# Patient Record
Sex: MALE | Race: WHITE | NOT HISPANIC OR LATINO | URBAN - METROPOLITAN AREA
[De-identification: names, ages, dates, MRNs, and addresses within clinical notes are randomized per-mention and may not be internally consistent; named-entity substitution may affect disease eponyms.]

---

## 2018-03-26 ENCOUNTER — APPOINTMENT (OUTPATIENT)
Dept: URBAN - METROPOLITAN AREA CLINIC 186 | Age: 48
Setting detail: DERMATOLOGY
End: 2018-03-26

## 2018-03-26 DIAGNOSIS — D22 MELANOCYTIC NEVI: ICD-10-CM

## 2018-03-26 DIAGNOSIS — L81.4 OTHER MELANIN HYPERPIGMENTATION: ICD-10-CM

## 2018-03-26 DIAGNOSIS — L57.8 OTHER SKIN CHANGES DUE TO CHRONIC EXPOSURE TO NONIONIZING RADIATION: ICD-10-CM

## 2018-03-26 DIAGNOSIS — D18.0 HEMANGIOMA: ICD-10-CM

## 2018-03-26 DIAGNOSIS — L73.8 OTHER SPECIFIED FOLLICULAR DISORDERS: ICD-10-CM

## 2018-03-26 DIAGNOSIS — Z85.820 PERSONAL HISTORY OF MALIGNANT MELANOMA OF SKIN: ICD-10-CM

## 2018-03-26 DIAGNOSIS — L82.1 OTHER SEBORRHEIC KERATOSIS: ICD-10-CM

## 2018-03-26 PROBLEM — D22.62 MELANOCYTIC NEVI OF LEFT UPPER LIMB, INCLUDING SHOULDER: Status: ACTIVE | Noted: 2018-03-26

## 2018-03-26 PROBLEM — D18.01 HEMANGIOMA OF SKIN AND SUBCUTANEOUS TISSUE: Status: ACTIVE | Noted: 2018-03-26

## 2018-03-26 PROCEDURE — OTHER ADDITIONAL NOTES: OTHER

## 2018-03-26 PROCEDURE — OTHER COUNSELING: OTHER

## 2018-03-26 PROCEDURE — 99214 OFFICE O/P EST MOD 30 MIN: CPT

## 2018-03-26 PROCEDURE — OTHER REASSURANCE: OTHER

## 2018-03-26 PROCEDURE — OTHER SUNSCREEN RECOMMENDATIONS: OTHER

## 2018-03-26 ASSESSMENT — LOCATION ZONE DERM
LOCATION ZONE: TRUNK
LOCATION ZONE: TRUNK
LOCATION ZONE: FACE
LOCATION ZONE: ARM

## 2018-03-26 ASSESSMENT — LOCATION DETAILED DESCRIPTION DERM
LOCATION DETAILED: RIGHT MEDIAL SUPERIOR CHEST
LOCATION DETAILED: LEFT PROXIMAL DORSAL FOREARM
LOCATION DETAILED: PERIUMBILICAL SKIN
LOCATION DETAILED: EPIGASTRIC SKIN
LOCATION DETAILED: EPIGASTRIC SKIN
LOCATION DETAILED: LEFT CENTRAL MALAR CHEEK
LOCATION DETAILED: LEFT SUPERIOR UPPER BACK

## 2018-03-26 ASSESSMENT — LOCATION SIMPLE DESCRIPTION DERM
LOCATION SIMPLE: CHEST
LOCATION SIMPLE: LEFT CHEEK
LOCATION SIMPLE: ABDOMEN
LOCATION SIMPLE: ABDOMEN
LOCATION SIMPLE: LEFT UPPER BACK
LOCATION SIMPLE: LEFT FOREARM

## 2018-03-26 NOTE — PROCEDURE: ADDITIONAL NOTES
Additional Notes: Patient had E&R (Excision and Repair) in 2016 to left cheek for Malignant Melanoma

## 2018-03-26 NOTE — HPI: EVALUATION OF SKIN LESION(S)
How Severe Are Your Spot(S)?: mild
Have Your Spot(S) Been Treated In The Past?: has not been treated
Hpi Title: Evaluation of Skin Lesions
Location: Left cheek
Year Removed: 2016

## 2018-03-26 NOTE — PROCEDURE: SUNSCREEN RECOMMENDATIONS

## 2018-09-17 ENCOUNTER — APPOINTMENT (OUTPATIENT)
Dept: URBAN - METROPOLITAN AREA CLINIC 186 | Age: 48
Setting detail: DERMATOLOGY
End: 2018-09-17

## 2018-09-17 DIAGNOSIS — L82.1 OTHER SEBORRHEIC KERATOSIS: ICD-10-CM

## 2018-09-17 DIAGNOSIS — L73.8 OTHER SPECIFIED FOLLICULAR DISORDERS: ICD-10-CM

## 2018-09-17 DIAGNOSIS — D22 MELANOCYTIC NEVI: ICD-10-CM

## 2018-09-17 DIAGNOSIS — Z85.820 PERSONAL HISTORY OF MALIGNANT MELANOMA OF SKIN: ICD-10-CM

## 2018-09-17 DIAGNOSIS — L57.8 OTHER SKIN CHANGES DUE TO CHRONIC EXPOSURE TO NONIONIZING RADIATION: ICD-10-CM

## 2018-09-17 DIAGNOSIS — L81.4 OTHER MELANIN HYPERPIGMENTATION: ICD-10-CM

## 2018-09-17 DIAGNOSIS — D18.0 HEMANGIOMA: ICD-10-CM

## 2018-09-17 PROBLEM — D22.62 MELANOCYTIC NEVI OF LEFT UPPER LIMB, INCLUDING SHOULDER: Status: ACTIVE | Noted: 2018-09-17

## 2018-09-17 PROBLEM — D18.01 HEMANGIOMA OF SKIN AND SUBCUTANEOUS TISSUE: Status: ACTIVE | Noted: 2018-09-17

## 2018-09-17 PROCEDURE — OTHER REASSURANCE: OTHER

## 2018-09-17 PROCEDURE — OTHER COUNSELING: OTHER

## 2018-09-17 PROCEDURE — 99214 OFFICE O/P EST MOD 30 MIN: CPT

## 2018-09-17 PROCEDURE — OTHER ADDITIONAL NOTES: OTHER

## 2018-09-17 PROCEDURE — OTHER SUNSCREEN RECOMMENDATIONS: OTHER

## 2018-09-17 ASSESSMENT — LOCATION ZONE DERM
LOCATION ZONE: ARM
LOCATION ZONE: TRUNK
LOCATION ZONE: FACE
LOCATION ZONE: TRUNK

## 2018-09-17 ASSESSMENT — LOCATION SIMPLE DESCRIPTION DERM
LOCATION SIMPLE: ABDOMEN
LOCATION SIMPLE: LEFT CHEEK
LOCATION SIMPLE: CHEST
LOCATION SIMPLE: ABDOMEN
LOCATION SIMPLE: LEFT UPPER BACK
LOCATION SIMPLE: LEFT FOREARM

## 2018-09-17 ASSESSMENT — LOCATION DETAILED DESCRIPTION DERM
LOCATION DETAILED: LEFT CENTRAL MALAR CHEEK
LOCATION DETAILED: EPIGASTRIC SKIN
LOCATION DETAILED: PERIUMBILICAL SKIN
LOCATION DETAILED: EPIGASTRIC SKIN
LOCATION DETAILED: RIGHT MEDIAL SUPERIOR CHEST
LOCATION DETAILED: LEFT SUPERIOR UPPER BACK
LOCATION DETAILED: LEFT PROXIMAL DORSAL FOREARM

## 2019-03-11 ENCOUNTER — APPOINTMENT (OUTPATIENT)
Dept: URBAN - METROPOLITAN AREA CLINIC 186 | Age: 49
Setting detail: DERMATOLOGY
End: 2019-03-11

## 2019-03-11 DIAGNOSIS — L57.8 OTHER SKIN CHANGES DUE TO CHRONIC EXPOSURE TO NONIONIZING RADIATION: ICD-10-CM

## 2019-03-11 DIAGNOSIS — D18.0 HEMANGIOMA: ICD-10-CM

## 2019-03-11 DIAGNOSIS — L81.4 OTHER MELANIN HYPERPIGMENTATION: ICD-10-CM

## 2019-03-11 DIAGNOSIS — D22 MELANOCYTIC NEVI: ICD-10-CM

## 2019-03-11 DIAGNOSIS — Z85.820 PERSONAL HISTORY OF MALIGNANT MELANOMA OF SKIN: ICD-10-CM

## 2019-03-11 DIAGNOSIS — L82.1 OTHER SEBORRHEIC KERATOSIS: ICD-10-CM

## 2019-03-11 DIAGNOSIS — L73.8 OTHER SPECIFIED FOLLICULAR DISORDERS: ICD-10-CM

## 2019-03-11 PROBLEM — D22.62 MELANOCYTIC NEVI OF LEFT UPPER LIMB, INCLUDING SHOULDER: Status: ACTIVE | Noted: 2019-03-11

## 2019-03-11 PROBLEM — D18.01 HEMANGIOMA OF SKIN AND SUBCUTANEOUS TISSUE: Status: ACTIVE | Noted: 2019-03-11

## 2019-03-11 PROCEDURE — OTHER SUNSCREEN RECOMMENDATIONS: OTHER

## 2019-03-11 PROCEDURE — OTHER REASSURANCE: OTHER

## 2019-03-11 PROCEDURE — OTHER COUNSELING: OTHER

## 2019-03-11 PROCEDURE — OTHER ADDITIONAL NOTES: OTHER

## 2019-03-11 PROCEDURE — 99214 OFFICE O/P EST MOD 30 MIN: CPT

## 2019-03-11 ASSESSMENT — LOCATION DETAILED DESCRIPTION DERM
LOCATION DETAILED: LEFT CENTRAL MALAR CHEEK
LOCATION DETAILED: PERIUMBILICAL SKIN
LOCATION DETAILED: LEFT PROXIMAL DORSAL FOREARM
LOCATION DETAILED: EPIGASTRIC SKIN
LOCATION DETAILED: RIGHT MEDIAL SUPERIOR CHEST
LOCATION DETAILED: LEFT SUPERIOR UPPER BACK
LOCATION DETAILED: EPIGASTRIC SKIN

## 2019-03-11 ASSESSMENT — LOCATION SIMPLE DESCRIPTION DERM
LOCATION SIMPLE: ABDOMEN
LOCATION SIMPLE: ABDOMEN
LOCATION SIMPLE: LEFT CHEEK
LOCATION SIMPLE: LEFT UPPER BACK
LOCATION SIMPLE: CHEST
LOCATION SIMPLE: LEFT FOREARM

## 2019-03-11 ASSESSMENT — LOCATION ZONE DERM
LOCATION ZONE: TRUNK
LOCATION ZONE: FACE
LOCATION ZONE: TRUNK
LOCATION ZONE: ARM

## 2019-03-11 NOTE — HPI: EVALUATION OF SKIN LESION(S)
What Type Of Note Output Would You Prefer (Optional)?: Bullet Format
How Severe Are Your Spot(S)?: mild
Have Your Spot(S) Been Treated In The Past?: has not been treated
Hpi Title: Evaluation of Skin Lesions
Location: Left cheek
Year Removed: 2016

## 2019-09-09 ENCOUNTER — APPOINTMENT (OUTPATIENT)
Dept: URBAN - METROPOLITAN AREA CLINIC 186 | Age: 49
Setting detail: DERMATOLOGY
End: 2019-09-09

## 2019-09-09 DIAGNOSIS — L73.8 OTHER SPECIFIED FOLLICULAR DISORDERS: ICD-10-CM

## 2019-09-09 DIAGNOSIS — L81.4 OTHER MELANIN HYPERPIGMENTATION: ICD-10-CM

## 2019-09-09 DIAGNOSIS — D18.0 HEMANGIOMA: ICD-10-CM

## 2019-09-09 DIAGNOSIS — Z85.820 PERSONAL HISTORY OF MALIGNANT MELANOMA OF SKIN: ICD-10-CM

## 2019-09-09 DIAGNOSIS — D22 MELANOCYTIC NEVI: ICD-10-CM

## 2019-09-09 DIAGNOSIS — L82.1 OTHER SEBORRHEIC KERATOSIS: ICD-10-CM

## 2019-09-09 PROBLEM — D22.62 MELANOCYTIC NEVI OF LEFT UPPER LIMB, INCLUDING SHOULDER: Status: ACTIVE | Noted: 2019-09-09

## 2019-09-09 PROBLEM — D18.01 HEMANGIOMA OF SKIN AND SUBCUTANEOUS TISSUE: Status: ACTIVE | Noted: 2019-09-09

## 2019-09-09 PROCEDURE — 99214 OFFICE O/P EST MOD 30 MIN: CPT

## 2019-09-09 PROCEDURE — OTHER COUNSELING: OTHER

## 2019-09-09 PROCEDURE — OTHER SUNSCREEN RECOMMENDATIONS: OTHER

## 2019-09-09 PROCEDURE — OTHER ADDITIONAL NOTES: OTHER

## 2019-09-09 PROCEDURE — OTHER REASSURANCE: OTHER

## 2019-09-09 ASSESSMENT — LOCATION ZONE DERM
LOCATION ZONE: FACE
LOCATION ZONE: ARM
LOCATION ZONE: TRUNK
LOCATION ZONE: TRUNK

## 2019-09-09 ASSESSMENT — LOCATION SIMPLE DESCRIPTION DERM
LOCATION SIMPLE: LEFT FOREARM
LOCATION SIMPLE: LEFT CHEEK
LOCATION SIMPLE: LEFT UPPER BACK
LOCATION SIMPLE: ABDOMEN
LOCATION SIMPLE: ABDOMEN

## 2019-09-09 ASSESSMENT — LOCATION DETAILED DESCRIPTION DERM
LOCATION DETAILED: LEFT PROXIMAL DORSAL FOREARM
LOCATION DETAILED: LEFT SUPERIOR UPPER BACK
LOCATION DETAILED: LEFT CENTRAL MALAR CHEEK
LOCATION DETAILED: EPIGASTRIC SKIN
LOCATION DETAILED: PERIUMBILICAL SKIN
LOCATION DETAILED: EPIGASTRIC SKIN

## 2019-09-24 ENCOUNTER — APPOINTMENT (OUTPATIENT)
Dept: URBAN - METROPOLITAN AREA CLINIC 186 | Age: 49
Setting detail: DERMATOLOGY
End: 2019-09-24

## 2019-09-24 DIAGNOSIS — Z86.006 PERSONAL HISTORY OF MELANOMA IN-SITU: ICD-10-CM

## 2019-09-24 PROBLEM — Z85.820 PERSONAL HISTORY OF MALIGNANT MELANOMA OF SKIN: Status: ACTIVE | Noted: 2019-09-24

## 2019-09-24 PROCEDURE — OTHER MIPS QUALITY: OTHER

## 2020-03-09 ENCOUNTER — APPOINTMENT (OUTPATIENT)
Dept: URBAN - METROPOLITAN AREA CLINIC 186 | Age: 50
Setting detail: DERMATOLOGY
End: 2020-03-09

## 2020-03-09 DIAGNOSIS — Z85.820 PERSONAL HISTORY OF MALIGNANT MELANOMA OF SKIN: ICD-10-CM

## 2020-03-09 DIAGNOSIS — Z80.8 FAMILY HISTORY OF MALIGNANT NEOPLASM OF OTHER ORGANS OR SYSTEMS: ICD-10-CM

## 2020-03-09 DIAGNOSIS — D22 MELANOCYTIC NEVI: ICD-10-CM

## 2020-03-09 DIAGNOSIS — D18.0 HEMANGIOMA: ICD-10-CM

## 2020-03-09 DIAGNOSIS — L57.0 ACTINIC KERATOSIS: ICD-10-CM

## 2020-03-09 DIAGNOSIS — L81.4 OTHER MELANIN HYPERPIGMENTATION: ICD-10-CM

## 2020-03-09 DIAGNOSIS — L82.1 OTHER SEBORRHEIC KERATOSIS: ICD-10-CM

## 2020-03-09 DIAGNOSIS — L73.8 OTHER SPECIFIED FOLLICULAR DISORDERS: ICD-10-CM

## 2020-03-09 PROBLEM — D22.62 MELANOCYTIC NEVI OF LEFT UPPER LIMB, INCLUDING SHOULDER: Status: ACTIVE | Noted: 2020-03-09

## 2020-03-09 PROBLEM — D18.01 HEMANGIOMA OF SKIN AND SUBCUTANEOUS TISSUE: Status: ACTIVE | Noted: 2020-03-09

## 2020-03-09 PROCEDURE — OTHER REASSURANCE: OTHER

## 2020-03-09 PROCEDURE — 99214 OFFICE O/P EST MOD 30 MIN: CPT | Mod: 25

## 2020-03-09 PROCEDURE — 17000 DESTRUCT PREMALG LESION: CPT

## 2020-03-09 PROCEDURE — OTHER ADDITIONAL NOTES: OTHER

## 2020-03-09 PROCEDURE — OTHER COUNSELING: OTHER

## 2020-03-09 PROCEDURE — OTHER SUNSCREEN RECOMMENDATIONS: OTHER

## 2020-03-09 PROCEDURE — OTHER LIQUID NITROGEN: OTHER

## 2020-03-09 ASSESSMENT — LOCATION DETAILED DESCRIPTION DERM
LOCATION DETAILED: LEFT CENTRAL MALAR CHEEK
LOCATION DETAILED: PERIUMBILICAL SKIN
LOCATION DETAILED: EPIGASTRIC SKIN
LOCATION DETAILED: LEFT SUPERIOR UPPER BACK
LOCATION DETAILED: EPIGASTRIC SKIN
LOCATION DETAILED: LEFT PROXIMAL DORSAL FOREARM
LOCATION DETAILED: RIGHT FOREHEAD

## 2020-03-09 ASSESSMENT — LOCATION SIMPLE DESCRIPTION DERM
LOCATION SIMPLE: LEFT CHEEK
LOCATION SIMPLE: LEFT UPPER BACK
LOCATION SIMPLE: LEFT FOREARM
LOCATION SIMPLE: ABDOMEN
LOCATION SIMPLE: ABDOMEN
LOCATION SIMPLE: RIGHT FOREHEAD

## 2020-03-09 ASSESSMENT — LOCATION ZONE DERM
LOCATION ZONE: TRUNK
LOCATION ZONE: FACE
LOCATION ZONE: ARM
LOCATION ZONE: TRUNK

## 2020-03-09 NOTE — PROCEDURE: LIQUID NITROGEN
Render Note In Bullet Format When Appropriate: No
Render Post-Care Instructions In Note?: yes
Duration Of Freeze Thaw-Cycle (Seconds): 5
Consent: The patient's consent was obtained including but not limited to risks of crusting, scabbing, blistering, scarring, darker or lighter pigmentary change, recurrence, incomplete removal and infection.
Detail Level: Simple
Post-Care Instructions: I reviewed with the patient in detail post-care instructions. Patient is to wear sunprotection, and avoid picking at any of the treated lesions. Pt may apply Vaseline to crusted or scabbing areas.
Number Of Freeze-Thaw Cycles: 2 freeze-thaw cycles

## 2020-03-09 NOTE — PROCEDURE: ADDITIONAL NOTES
Detail Level: Simple
Additional Notes: Patient’s father has history of Malignant Melanoma
Additional Notes: Patient had E&R (Excision and Repair) in 2016 to left cheek for Malignant Melanoma

## 2020-10-05 ENCOUNTER — APPOINTMENT (OUTPATIENT)
Dept: URBAN - METROPOLITAN AREA CLINIC 186 | Age: 50
Setting detail: DERMATOLOGY
End: 2020-10-05

## 2020-10-05 VITALS — TEMPERATURE: 97.5 F

## 2020-10-05 DIAGNOSIS — D485 NEOPLASM OF UNCERTAIN BEHAVIOR OF SKIN: ICD-10-CM

## 2020-10-05 DIAGNOSIS — Z85.820 PERSONAL HISTORY OF MALIGNANT MELANOMA OF SKIN: ICD-10-CM

## 2020-10-05 DIAGNOSIS — L82.1 OTHER SEBORRHEIC KERATOSIS: ICD-10-CM

## 2020-10-05 DIAGNOSIS — L81.4 OTHER MELANIN HYPERPIGMENTATION: ICD-10-CM

## 2020-10-05 DIAGNOSIS — D22 MELANOCYTIC NEVI: ICD-10-CM

## 2020-10-05 DIAGNOSIS — Z80.8 FAMILY HISTORY OF MALIGNANT NEOPLASM OF OTHER ORGANS OR SYSTEMS: ICD-10-CM

## 2020-10-05 DIAGNOSIS — L73.8 OTHER SPECIFIED FOLLICULAR DISORDERS: ICD-10-CM

## 2020-10-05 DIAGNOSIS — D18.0 HEMANGIOMA: ICD-10-CM

## 2020-10-05 DIAGNOSIS — Z87.2 PERSONAL HISTORY OF DISEASES OF THE SKIN AND SUBCUTANEOUS TISSUE: ICD-10-CM

## 2020-10-05 PROBLEM — D18.01 HEMANGIOMA OF SKIN AND SUBCUTANEOUS TISSUE: Status: ACTIVE | Noted: 2020-10-05

## 2020-10-05 PROBLEM — D22.62 MELANOCYTIC NEVI OF LEFT UPPER LIMB, INCLUDING SHOULDER: Status: ACTIVE | Noted: 2020-10-05

## 2020-10-05 PROBLEM — D48.5 NEOPLASM OF UNCERTAIN BEHAVIOR OF SKIN: Status: ACTIVE | Noted: 2020-10-05

## 2020-10-05 PROCEDURE — 99214 OFFICE O/P EST MOD 30 MIN: CPT | Mod: 25

## 2020-10-05 PROCEDURE — OTHER COUNSELING: OTHER

## 2020-10-05 PROCEDURE — OTHER ADDITIONAL NOTES: OTHER

## 2020-10-05 PROCEDURE — OTHER REASSURANCE: OTHER

## 2020-10-05 PROCEDURE — OTHER SUNSCREEN RECOMMENDATIONS: OTHER

## 2020-10-05 PROCEDURE — OTHER BIOPSY BY SHAVE METHOD: OTHER

## 2020-10-05 PROCEDURE — 11102 TANGNTL BX SKIN SINGLE LES: CPT

## 2020-10-05 ASSESSMENT — LOCATION DETAILED DESCRIPTION DERM
LOCATION DETAILED: EPIGASTRIC SKIN
LOCATION DETAILED: LEFT CENTRAL MALAR CHEEK
LOCATION DETAILED: EPIGASTRIC SKIN
LOCATION DETAILED: PERIUMBILICAL SKIN
LOCATION DETAILED: LEFT SUPERIOR UPPER BACK
LOCATION DETAILED: RIGHT ANTERIOR PROXIMAL UPPER ARM
LOCATION DETAILED: RIGHT PROXIMAL CALF
LOCATION DETAILED: LEFT PROXIMAL DORSAL FOREARM

## 2020-10-05 ASSESSMENT — LOCATION SIMPLE DESCRIPTION DERM
LOCATION SIMPLE: LEFT CHEEK
LOCATION SIMPLE: ABDOMEN
LOCATION SIMPLE: LEFT UPPER BACK
LOCATION SIMPLE: LEFT FOREARM
LOCATION SIMPLE: ABDOMEN
LOCATION SIMPLE: RIGHT UPPER ARM
LOCATION SIMPLE: RIGHT CALF

## 2020-10-05 ASSESSMENT — LOCATION ZONE DERM
LOCATION ZONE: TRUNK
LOCATION ZONE: ARM
LOCATION ZONE: FACE
LOCATION ZONE: LEG
LOCATION ZONE: TRUNK

## 2020-10-05 NOTE — PROCEDURE: REASSURANCE
Include Location In Plan?: Yes
Detail Level: Generalized
Additional Note: Patient concerned about growth; reassurance provided that this is benign.
Detail Level: Simple
Detail Level: Detailed

## 2020-10-05 NOTE — PROCEDURE: BIOPSY BY SHAVE METHOD
Validate Triangulation: No
Size Of Lesion In Cm: 0
Information: Selecting Yes will display possible errors in your note based on the variables you have selected. This validation is only offered as a suggestion for you. PLEASE NOTE THAT THE VALIDATION TEXT WILL BE REMOVED WHEN YOU FINALIZE YOUR NOTE. IF YOU WANT TO FAX A PRELIMINARY NOTE YOU WILL NEED TO TOGGLE THIS TO 'NO' IF YOU DO NOT WANT IT IN YOUR FAXED NOTE.
Detail Level: Detailed
Silver Nitrate Text: The wound bed was treated with silver nitrate after the biopsy was performed.
Anesthesia Type: 1% lidocaine with 1:100,000 epinephrine and a 1:10 solution of 8.4% sodium bicarbonate
Biopsy Type: H and E
Render Post-Care Instructions In Note?: yes
Depth Of Biopsy: dermis
Anesthesia Volume In Cc (Will Not Render If 0): 0.5
Dressing: bandage
Electrodesiccation And Curettage Text: The wound bed was treated with electrodesiccation and curettage after the biopsy was performed.
Consent: Written consent was obtained and risks were reviewed including but not limited to scarring, infection, bleeding, scabbing, incomplete removal, nerve damage and allergy to anesthesia.
Type Of Destruction Used: Curettage
Post-Care Instructions: I reviewed with the patient in detail post-care instructions. Patient is to keep the biopsy site dry overnight, and then apply Vaseline twice daily until healed.
Notification Instructions: Patient will be notified of biopsy results. However, patient instructed to call the office if not contacted within 2 weeks.
Billing Type: Third-Party Bill
Electrodesiccation Text: The wound bed was treated with electrodesiccation after the biopsy was performed.
Hemostasis: Aluminum Chloride
Biopsy Method: Personna blade
Cryotherapy Text: The wound bed was treated with cryotherapy after the biopsy was performed.
Wound Care: Petrolatum
Curettage Text: The wound bed was treated with curettage after the biopsy was performed.

## 2021-04-05 ENCOUNTER — APPOINTMENT (OUTPATIENT)
Dept: URBAN - METROPOLITAN AREA CLINIC 186 | Age: 51
Setting detail: DERMATOLOGY
End: 2021-04-05

## 2021-04-05 VITALS — TEMPERATURE: 98.9 F

## 2021-04-05 DIAGNOSIS — Z85.820 PERSONAL HISTORY OF MALIGNANT MELANOMA OF SKIN: ICD-10-CM

## 2021-04-05 DIAGNOSIS — L82.1 OTHER SEBORRHEIC KERATOSIS: ICD-10-CM

## 2021-04-05 DIAGNOSIS — D485 NEOPLASM OF UNCERTAIN BEHAVIOR OF SKIN: ICD-10-CM

## 2021-04-05 DIAGNOSIS — Z87.2 PERSONAL HISTORY OF DISEASES OF THE SKIN AND SUBCUTANEOUS TISSUE: ICD-10-CM

## 2021-04-05 DIAGNOSIS — D18.0 HEMANGIOMA: ICD-10-CM

## 2021-04-05 DIAGNOSIS — L73.8 OTHER SPECIFIED FOLLICULAR DISORDERS: ICD-10-CM

## 2021-04-05 DIAGNOSIS — L57.0 ACTINIC KERATOSIS: ICD-10-CM

## 2021-04-05 DIAGNOSIS — L81.4 OTHER MELANIN HYPERPIGMENTATION: ICD-10-CM

## 2021-04-05 DIAGNOSIS — D22 MELANOCYTIC NEVI: ICD-10-CM

## 2021-04-05 DIAGNOSIS — Z80.8 FAMILY HISTORY OF MALIGNANT NEOPLASM OF OTHER ORGANS OR SYSTEMS: ICD-10-CM

## 2021-04-05 PROBLEM — D48.5 NEOPLASM OF UNCERTAIN BEHAVIOR OF SKIN: Status: ACTIVE | Noted: 2021-04-05

## 2021-04-05 PROBLEM — D18.01 HEMANGIOMA OF SKIN AND SUBCUTANEOUS TISSUE: Status: ACTIVE | Noted: 2021-04-05

## 2021-04-05 PROBLEM — D22.62 MELANOCYTIC NEVI OF LEFT UPPER LIMB, INCLUDING SHOULDER: Status: ACTIVE | Noted: 2021-04-05

## 2021-04-05 PROCEDURE — OTHER ADDITIONAL NOTES: OTHER

## 2021-04-05 PROCEDURE — 99213 OFFICE O/P EST LOW 20 MIN: CPT | Mod: 25

## 2021-04-05 PROCEDURE — 17000 DESTRUCT PREMALG LESION: CPT | Mod: 59

## 2021-04-05 PROCEDURE — OTHER SUNSCREEN RECOMMENDATIONS: OTHER

## 2021-04-05 PROCEDURE — OTHER COUNSELING: OTHER

## 2021-04-05 PROCEDURE — OTHER SHAVE REMOVAL: OTHER

## 2021-04-05 PROCEDURE — OTHER MIPS QUALITY: OTHER

## 2021-04-05 PROCEDURE — OTHER LIQUID NITROGEN: OTHER

## 2021-04-05 PROCEDURE — OTHER REASSURANCE: OTHER

## 2021-04-05 PROCEDURE — 11300 SHAVE SKIN LESION 0.5 CM/<: CPT

## 2021-04-05 ASSESSMENT — LOCATION DETAILED DESCRIPTION DERM
LOCATION DETAILED: RIGHT ANTERIOR PROXIMAL UPPER ARM
LOCATION DETAILED: EPIGASTRIC SKIN
LOCATION DETAILED: LEFT SUPERIOR UPPER BACK
LOCATION DETAILED: LEFT PROXIMAL DORSAL FOREARM
LOCATION DETAILED: LEFT INFERIOR LATERAL FOREHEAD
LOCATION DETAILED: EPIGASTRIC SKIN
LOCATION DETAILED: PERIUMBILICAL SKIN
LOCATION DETAILED: LEFT CENTRAL MALAR CHEEK
LOCATION DETAILED: LEFT SUPERIOR LATERAL UPPER BACK

## 2021-04-05 ASSESSMENT — LOCATION SIMPLE DESCRIPTION DERM
LOCATION SIMPLE: LEFT FOREHEAD
LOCATION SIMPLE: ABDOMEN
LOCATION SIMPLE: LEFT FOREARM
LOCATION SIMPLE: LEFT UPPER BACK
LOCATION SIMPLE: RIGHT UPPER ARM
LOCATION SIMPLE: LEFT CHEEK
LOCATION SIMPLE: ABDOMEN

## 2021-04-05 ASSESSMENT — LOCATION ZONE DERM
LOCATION ZONE: TRUNK
LOCATION ZONE: TRUNK
LOCATION ZONE: ARM
LOCATION ZONE: FACE

## 2021-04-05 NOTE — PROCEDURE: SHAVE REMOVAL
Detail Level: Detailed
Biopsy Method: Dermablade
Bill 25921 For Specimen Handling/Conveyance To Laboratory?: no
Hemostasis: Drysol
Medical Necessity Clause: This procedure was medically necessary because the lesion that was treated was:
Was A Bandage Applied: Yes
X Size Of Lesion In Cm (Optional): 0
Size Of Lesion In Cm (Required): 0.1
Billing Type: Third-Party Bill
Consent was obtained from the patient. The risks and benefits to therapy were discussed in detail. Specifically, the risks of infection, scarring, bleeding, prolonged wound healing, incomplete removal, allergy to anesthesia, nerve injury and recurrence were addressed. Prior to the procedure, the treatment site was clearly identified and confirmed by the patient. All components of Universal Protocol/PAUSE Rule completed.
Wound Care: Petrolatum
Path Notes (To The Dermatopathologist): Please check margins.
Notification Instructions: Patient will be notified of pathology results. However, patient instructed to call the office if not contacted within 2 weeks.
Anesthesia Type: 1% lidocaine with epinephrine
Medical Necessity Information: It is in your best interest to select a reason for this procedure from the list below. All of these items fulfill various CMS LCD requirements except the new and changing color options.
Post-Care Instructions: I reviewed with the patient in detail post-care instructions. Patient is to keep the biopsy site dry overnight, and then apply bacitracin twice daily until healed. Patient may apply hydrogen peroxide soaks to remove any crusting.

## 2021-04-05 NOTE — PROCEDURE: REASSURANCE
Detail Level: Detailed
Include Location In Plan?: Yes
Detail Level: Generalized
Additional Note: Patient concerned about growth; reassurance provided that this is benign.
Detail Level: Simple

## 2021-04-05 NOTE — PROCEDURE: LIQUID NITROGEN
Duration Of Freeze Thaw-Cycle (Seconds): 5
Post-Care Instructions: I reviewed with the patient in detail post-care instructions. Patient is to wear sunprotection, and avoid picking at any of the treated lesions. Pt may apply Vaseline to crusted or scabbing areas.
Render In Bullet Format When Appropriate: No
Detail Level: Zone
Total Number Of Aks Treated: 1
Render Post-Care Instructions In Note?: yes
Consent: The patient's verbal consent was obtained including but not limited to risks of crusting, scabbing, blistering, scarring, darker or lighter pigmentary change, recurrence, incomplete removal and infection.
Number Of Freeze-Thaw Cycles: 1 freeze-thaw cycle

## 2021-04-05 NOTE — PROCEDURE: ADDITIONAL NOTES
Additional Notes: Patient’s father has history of Malignant Melanoma
Detail Level: Simple
Additional Notes: Patient had E&R (Excision and Repair) in 2016 to left cheek for Malignant Melanoma

## 2021-10-04 ENCOUNTER — APPOINTMENT (OUTPATIENT)
Dept: URBAN - METROPOLITAN AREA CLINIC 186 | Age: 51
Setting detail: DERMATOLOGY
End: 2021-10-04

## 2021-10-04 DIAGNOSIS — Z85.820 PERSONAL HISTORY OF MALIGNANT MELANOMA OF SKIN: ICD-10-CM

## 2021-10-04 DIAGNOSIS — D22 MELANOCYTIC NEVI: ICD-10-CM

## 2021-10-04 DIAGNOSIS — D485 NEOPLASM OF UNCERTAIN BEHAVIOR OF SKIN: ICD-10-CM

## 2021-10-04 DIAGNOSIS — L81.4 OTHER MELANIN HYPERPIGMENTATION: ICD-10-CM

## 2021-10-04 DIAGNOSIS — Z87.2 PERSONAL HISTORY OF DISEASES OF THE SKIN AND SUBCUTANEOUS TISSUE: ICD-10-CM

## 2021-10-04 DIAGNOSIS — D18.0 HEMANGIOMA: ICD-10-CM

## 2021-10-04 DIAGNOSIS — Z80.8 FAMILY HISTORY OF MALIGNANT NEOPLASM OF OTHER ORGANS OR SYSTEMS: ICD-10-CM

## 2021-10-04 DIAGNOSIS — L82.1 OTHER SEBORRHEIC KERATOSIS: ICD-10-CM

## 2021-10-04 DIAGNOSIS — L73.8 OTHER SPECIFIED FOLLICULAR DISORDERS: ICD-10-CM

## 2021-10-04 PROBLEM — D22.62 MELANOCYTIC NEVI OF LEFT UPPER LIMB, INCLUDING SHOULDER: Status: ACTIVE | Noted: 2021-10-04

## 2021-10-04 PROBLEM — D48.5 NEOPLASM OF UNCERTAIN BEHAVIOR OF SKIN: Status: ACTIVE | Noted: 2021-10-04

## 2021-10-04 PROBLEM — D18.01 HEMANGIOMA OF SKIN AND SUBCUTANEOUS TISSUE: Status: ACTIVE | Noted: 2021-10-04

## 2021-10-04 PROCEDURE — OTHER COUNSELING: OTHER

## 2021-10-04 PROCEDURE — 99213 OFFICE O/P EST LOW 20 MIN: CPT

## 2021-10-04 PROCEDURE — OTHER REASSURANCE: OTHER

## 2021-10-04 PROCEDURE — OTHER DIAGNOSIS COMMENT: OTHER

## 2021-10-04 PROCEDURE — OTHER ADDITIONAL NOTES: OTHER

## 2021-10-04 PROCEDURE — OTHER SUNSCREEN RECOMMENDATIONS: OTHER

## 2021-10-04 ASSESSMENT — LOCATION ZONE DERM
LOCATION ZONE: ARM
LOCATION ZONE: TRUNK
LOCATION ZONE: FACE
LOCATION ZONE: TRUNK

## 2021-10-04 ASSESSMENT — LOCATION DETAILED DESCRIPTION DERM
LOCATION DETAILED: LEFT CENTRAL MALAR CHEEK
LOCATION DETAILED: RIGHT ANTERIOR PROXIMAL UPPER ARM
LOCATION DETAILED: RIGHT SUPERIOR PREAURICULAR CHEEK
LOCATION DETAILED: EPIGASTRIC SKIN
LOCATION DETAILED: PERIUMBILICAL SKIN
LOCATION DETAILED: LEFT SUPERIOR UPPER BACK
LOCATION DETAILED: EPIGASTRIC SKIN
LOCATION DETAILED: LEFT PROXIMAL DORSAL FOREARM

## 2021-10-04 ASSESSMENT — LOCATION SIMPLE DESCRIPTION DERM
LOCATION SIMPLE: RIGHT CHEEK
LOCATION SIMPLE: RIGHT UPPER ARM
LOCATION SIMPLE: LEFT FOREARM
LOCATION SIMPLE: ABDOMEN
LOCATION SIMPLE: ABDOMEN
LOCATION SIMPLE: LEFT UPPER BACK
LOCATION SIMPLE: LEFT CHEEK

## 2021-10-04 NOTE — PROCEDURE: REASSURANCE
Include Location In Plan?: Yes
Additional Note: Patient concerned about growth; reassurance provided that this is benign.
Detail Level: Generalized
Detail Level: Detailed
Detail Level: Simple

## 2021-10-04 NOTE — PROCEDURE: ADDITIONAL NOTES
Detail Level: Simple
Additional Notes: Patient had E&R (Excision and Repair) in 2016 to left cheek for Malignant Melanoma
Render Risk Assessment In Note?: no
Additional Notes: Discussed benign clinical appearance of lesions. Patient to observe for changes in size, shape, or color. Recommended revisit if changes occur
Additional Notes: Patient’s father has history of Malignant Melanoma

## 2021-10-04 NOTE — PROCEDURE: DIAGNOSIS COMMENT
Render Risk Assessment In Note?: no
Detail Level: Zone
Comment: Discused lentigo versus sk evolving.  Will continue to monitor

## 2022-04-04 ENCOUNTER — APPOINTMENT (OUTPATIENT)
Dept: URBAN - METROPOLITAN AREA CLINIC 186 | Age: 52
Setting detail: DERMATOLOGY
End: 2022-04-04

## 2022-04-04 DIAGNOSIS — D18.0 HEMANGIOMA: ICD-10-CM

## 2022-04-04 DIAGNOSIS — L73.8 OTHER SPECIFIED FOLLICULAR DISORDERS: ICD-10-CM

## 2022-04-04 DIAGNOSIS — Z85.820 PERSONAL HISTORY OF MALIGNANT MELANOMA OF SKIN: ICD-10-CM

## 2022-04-04 DIAGNOSIS — L81.4 OTHER MELANIN HYPERPIGMENTATION: ICD-10-CM

## 2022-04-04 DIAGNOSIS — L82.1 OTHER SEBORRHEIC KERATOSIS: ICD-10-CM

## 2022-04-04 DIAGNOSIS — Z87.2 PERSONAL HISTORY OF DISEASES OF THE SKIN AND SUBCUTANEOUS TISSUE: ICD-10-CM

## 2022-04-04 DIAGNOSIS — Z80.8 FAMILY HISTORY OF MALIGNANT NEOPLASM OF OTHER ORGANS OR SYSTEMS: ICD-10-CM

## 2022-04-04 DIAGNOSIS — D22 MELANOCYTIC NEVI: ICD-10-CM

## 2022-04-04 PROBLEM — D22.62 MELANOCYTIC NEVI OF LEFT UPPER LIMB, INCLUDING SHOULDER: Status: ACTIVE | Noted: 2022-04-04

## 2022-04-04 PROBLEM — D18.01 HEMANGIOMA OF SKIN AND SUBCUTANEOUS TISSUE: Status: ACTIVE | Noted: 2022-04-04

## 2022-04-04 PROCEDURE — OTHER COUNSELING: OTHER

## 2022-04-04 PROCEDURE — 99213 OFFICE O/P EST LOW 20 MIN: CPT

## 2022-04-04 PROCEDURE — OTHER SUNSCREEN RECOMMENDATIONS: OTHER

## 2022-04-04 PROCEDURE — OTHER ADDITIONAL NOTES: OTHER

## 2022-04-04 PROCEDURE — OTHER REASSURANCE: OTHER

## 2022-04-04 ASSESSMENT — LOCATION DETAILED DESCRIPTION DERM
LOCATION DETAILED: LEFT SUPERIOR UPPER BACK
LOCATION DETAILED: PERIUMBILICAL SKIN
LOCATION DETAILED: EPIGASTRIC SKIN
LOCATION DETAILED: LEFT CENTRAL MALAR CHEEK
LOCATION DETAILED: LEFT PROXIMAL DORSAL FOREARM
LOCATION DETAILED: RIGHT ANTERIOR PROXIMAL UPPER ARM
LOCATION DETAILED: EPIGASTRIC SKIN

## 2022-04-04 ASSESSMENT — LOCATION SIMPLE DESCRIPTION DERM
LOCATION SIMPLE: LEFT CHEEK
LOCATION SIMPLE: ABDOMEN
LOCATION SIMPLE: ABDOMEN
LOCATION SIMPLE: LEFT FOREARM
LOCATION SIMPLE: LEFT UPPER BACK
LOCATION SIMPLE: RIGHT UPPER ARM

## 2022-04-04 ASSESSMENT — LOCATION ZONE DERM
LOCATION ZONE: FACE
LOCATION ZONE: TRUNK
LOCATION ZONE: ARM
LOCATION ZONE: TRUNK

## 2022-04-04 NOTE — PROCEDURE: REASSURANCE
Detail Level: Detailed
Detail Level: Generalized
Include Location In Plan?: Yes
Additional Note: Patient concerned about growth; reassurance provided that this is benign.
Detail Level: Simple

## 2022-10-11 ENCOUNTER — APPOINTMENT (OUTPATIENT)
Dept: URBAN - METROPOLITAN AREA CLINIC 186 | Age: 52
Setting detail: DERMATOLOGY
End: 2022-10-11

## 2022-10-11 DIAGNOSIS — L82.1 OTHER SEBORRHEIC KERATOSIS: ICD-10-CM

## 2022-10-11 DIAGNOSIS — L57.0 ACTINIC KERATOSIS: ICD-10-CM

## 2022-10-11 DIAGNOSIS — Z80.8 FAMILY HISTORY OF MALIGNANT NEOPLASM OF OTHER ORGANS OR SYSTEMS: ICD-10-CM

## 2022-10-11 DIAGNOSIS — D18.0 HEMANGIOMA: ICD-10-CM

## 2022-10-11 DIAGNOSIS — L73.8 OTHER SPECIFIED FOLLICULAR DISORDERS: ICD-10-CM

## 2022-10-11 DIAGNOSIS — D22 MELANOCYTIC NEVI: ICD-10-CM

## 2022-10-11 DIAGNOSIS — L81.4 OTHER MELANIN HYPERPIGMENTATION: ICD-10-CM

## 2022-10-11 DIAGNOSIS — Z85.820 PERSONAL HISTORY OF MALIGNANT MELANOMA OF SKIN: ICD-10-CM

## 2022-10-11 PROBLEM — D22.62 MELANOCYTIC NEVI OF LEFT UPPER LIMB, INCLUDING SHOULDER: Status: ACTIVE | Noted: 2022-10-11

## 2022-10-11 PROBLEM — D18.01 HEMANGIOMA OF SKIN AND SUBCUTANEOUS TISSUE: Status: ACTIVE | Noted: 2022-10-11

## 2022-10-11 PROCEDURE — OTHER ADDITIONAL NOTES: OTHER

## 2022-10-11 PROCEDURE — 17000 DESTRUCT PREMALG LESION: CPT

## 2022-10-11 PROCEDURE — OTHER MIPS QUALITY: OTHER

## 2022-10-11 PROCEDURE — 99213 OFFICE O/P EST LOW 20 MIN: CPT | Mod: 25

## 2022-10-11 PROCEDURE — OTHER COUNSELING: OTHER

## 2022-10-11 PROCEDURE — OTHER REASSURANCE: OTHER

## 2022-10-11 PROCEDURE — OTHER SUNSCREEN RECOMMENDATIONS: OTHER

## 2022-10-11 PROCEDURE — OTHER LIQUID NITROGEN: OTHER

## 2022-10-11 ASSESSMENT — LOCATION DETAILED DESCRIPTION DERM
LOCATION DETAILED: LEFT SUPERIOR UPPER BACK
LOCATION DETAILED: PERIUMBILICAL SKIN
LOCATION DETAILED: EPIGASTRIC SKIN
LOCATION DETAILED: RIGHT ANTERIOR PROXIMAL UPPER ARM
LOCATION DETAILED: RIGHT LATERAL MALAR CHEEK
LOCATION DETAILED: LEFT PROXIMAL DORSAL FOREARM
LOCATION DETAILED: LEFT CENTRAL MALAR CHEEK
LOCATION DETAILED: EPIGASTRIC SKIN

## 2022-10-11 ASSESSMENT — LOCATION SIMPLE DESCRIPTION DERM
LOCATION SIMPLE: LEFT FOREARM
LOCATION SIMPLE: LEFT UPPER BACK
LOCATION SIMPLE: ABDOMEN
LOCATION SIMPLE: LEFT CHEEK
LOCATION SIMPLE: RIGHT CHEEK
LOCATION SIMPLE: ABDOMEN
LOCATION SIMPLE: RIGHT UPPER ARM

## 2022-10-11 ASSESSMENT — LOCATION ZONE DERM
LOCATION ZONE: ARM
LOCATION ZONE: FACE
LOCATION ZONE: TRUNK
LOCATION ZONE: TRUNK

## 2022-10-11 NOTE — PROCEDURE: LIQUID NITROGEN
Number Of Freeze-Thaw Cycles: 2 freeze-thaw cycles
Post-Care Instructions: I reviewed with the patient in detail post-care instructions. Patient is to wear sunprotection, and avoid picking at any of the treated lesions. Pt may apply Vaseline to crusted or scabbing areas.
Total Number Of Aks Treated: 1
Render Post-Care Instructions In Note?: no
Consent: The patient's consent was obtained including but not limited to risks of crusting, scabbing, blistering, scarring, darker or lighter pigmentary change, recurrence, incomplete removal and infection.
Detail Level: Zone
Duration Of Freeze Thaw-Cycle (Seconds): 5

## 2022-10-11 NOTE — PROCEDURE: ADDITIONAL NOTES
Additional Notes: Patient had E&R (Excision and Repair) in 2016 to left cheek for Malignant Melanoma
Detail Level: Simple
Additional Notes: Patient’s father has history of Malignant Melanoma

## 2023-04-11 ENCOUNTER — APPOINTMENT (OUTPATIENT)
Dept: URBAN - METROPOLITAN AREA CLINIC 186 | Age: 53
Setting detail: DERMATOLOGY
End: 2023-04-11

## 2023-04-11 DIAGNOSIS — D22 MELANOCYTIC NEVI: ICD-10-CM

## 2023-04-11 DIAGNOSIS — Z85.820 PERSONAL HISTORY OF MALIGNANT MELANOMA OF SKIN: ICD-10-CM

## 2023-04-11 DIAGNOSIS — Z87.2 PERSONAL HISTORY OF DISEASES OF THE SKIN AND SUBCUTANEOUS TISSUE: ICD-10-CM

## 2023-04-11 DIAGNOSIS — D18.0 HEMANGIOMA: ICD-10-CM

## 2023-04-11 DIAGNOSIS — Z80.8 FAMILY HISTORY OF MALIGNANT NEOPLASM OF OTHER ORGANS OR SYSTEMS: ICD-10-CM

## 2023-04-11 DIAGNOSIS — L82.1 OTHER SEBORRHEIC KERATOSIS: ICD-10-CM

## 2023-04-11 DIAGNOSIS — L81.4 OTHER MELANIN HYPERPIGMENTATION: ICD-10-CM

## 2023-04-11 DIAGNOSIS — L73.8 OTHER SPECIFIED FOLLICULAR DISORDERS: ICD-10-CM

## 2023-04-11 PROBLEM — D22.62 MELANOCYTIC NEVI OF LEFT UPPER LIMB, INCLUDING SHOULDER: Status: ACTIVE | Noted: 2023-04-11

## 2023-04-11 PROBLEM — D18.01 HEMANGIOMA OF SKIN AND SUBCUTANEOUS TISSUE: Status: ACTIVE | Noted: 2023-04-11

## 2023-04-11 PROCEDURE — OTHER MIPS QUALITY: OTHER

## 2023-04-11 PROCEDURE — OTHER REASSURANCE: OTHER

## 2023-04-11 PROCEDURE — OTHER COUNSELING: OTHER

## 2023-04-11 PROCEDURE — 99213 OFFICE O/P EST LOW 20 MIN: CPT

## 2023-04-11 PROCEDURE — OTHER ADDITIONAL NOTES: OTHER

## 2023-04-11 PROCEDURE — OTHER SUNSCREEN RECOMMENDATIONS: OTHER

## 2023-04-11 ASSESSMENT — LOCATION DETAILED DESCRIPTION DERM
LOCATION DETAILED: RIGHT ANTERIOR PROXIMAL UPPER ARM
LOCATION DETAILED: LEFT SUPERIOR UPPER BACK
LOCATION DETAILED: SUPERIOR MID FOREHEAD
LOCATION DETAILED: EPIGASTRIC SKIN
LOCATION DETAILED: LEFT PROXIMAL DORSAL FOREARM
LOCATION DETAILED: PERIUMBILICAL SKIN
LOCATION DETAILED: LEFT CENTRAL MALAR CHEEK
LOCATION DETAILED: EPIGASTRIC SKIN

## 2023-04-11 ASSESSMENT — LOCATION SIMPLE DESCRIPTION DERM
LOCATION SIMPLE: RIGHT UPPER ARM
LOCATION SIMPLE: LEFT CHEEK
LOCATION SIMPLE: LEFT FOREARM
LOCATION SIMPLE: LEFT UPPER BACK
LOCATION SIMPLE: SUPERIOR FOREHEAD
LOCATION SIMPLE: ABDOMEN
LOCATION SIMPLE: ABDOMEN

## 2023-04-11 ASSESSMENT — LOCATION ZONE DERM
LOCATION ZONE: FACE
LOCATION ZONE: ARM
LOCATION ZONE: TRUNK
LOCATION ZONE: TRUNK

## 2023-10-23 ENCOUNTER — APPOINTMENT (OUTPATIENT)
Dept: URBAN - METROPOLITAN AREA CLINIC 186 | Age: 53
Setting detail: DERMATOLOGY
End: 2023-10-23

## 2023-10-23 DIAGNOSIS — D18.0 HEMANGIOMA: ICD-10-CM

## 2023-10-23 DIAGNOSIS — Z85.820 PERSONAL HISTORY OF MALIGNANT MELANOMA OF SKIN: ICD-10-CM

## 2023-10-23 DIAGNOSIS — L81.4 OTHER MELANIN HYPERPIGMENTATION: ICD-10-CM

## 2023-10-23 DIAGNOSIS — L73.8 OTHER SPECIFIED FOLLICULAR DISORDERS: ICD-10-CM

## 2023-10-23 DIAGNOSIS — L57.0 ACTINIC KERATOSIS: ICD-10-CM

## 2023-10-23 DIAGNOSIS — L82.1 OTHER SEBORRHEIC KERATOSIS: ICD-10-CM

## 2023-10-23 DIAGNOSIS — D22 MELANOCYTIC NEVI: ICD-10-CM

## 2023-10-23 DIAGNOSIS — Z80.8 FAMILY HISTORY OF MALIGNANT NEOPLASM OF OTHER ORGANS OR SYSTEMS: ICD-10-CM

## 2023-10-23 PROBLEM — D22.62 MELANOCYTIC NEVI OF LEFT UPPER LIMB, INCLUDING SHOULDER: Status: ACTIVE | Noted: 2023-10-23

## 2023-10-23 PROBLEM — D18.01 HEMANGIOMA OF SKIN AND SUBCUTANEOUS TISSUE: Status: ACTIVE | Noted: 2023-10-23

## 2023-10-23 PROCEDURE — OTHER SUNSCREEN RECOMMENDATIONS: OTHER

## 2023-10-23 PROCEDURE — OTHER ADDITIONAL NOTES: OTHER

## 2023-10-23 PROCEDURE — 99213 OFFICE O/P EST LOW 20 MIN: CPT | Mod: 25

## 2023-10-23 PROCEDURE — 17000 DESTRUCT PREMALG LESION: CPT

## 2023-10-23 PROCEDURE — OTHER REASSURANCE: OTHER

## 2023-10-23 PROCEDURE — OTHER LIQUID NITROGEN: OTHER

## 2023-10-23 PROCEDURE — OTHER COUNSELING: OTHER

## 2023-10-23 ASSESSMENT — LOCATION DETAILED DESCRIPTION DERM
LOCATION DETAILED: LEFT CENTRAL MALAR CHEEK
LOCATION DETAILED: LEFT PROXIMAL DORSAL FOREARM
LOCATION DETAILED: PERIUMBILICAL SKIN
LOCATION DETAILED: LEFT SUPERIOR UPPER BACK
LOCATION DETAILED: RIGHT ANTERIOR PROXIMAL UPPER ARM
LOCATION DETAILED: EPIGASTRIC SKIN
LOCATION DETAILED: EPIGASTRIC SKIN

## 2023-10-23 ASSESSMENT — LOCATION ZONE DERM
LOCATION ZONE: ARM
LOCATION ZONE: FACE
LOCATION ZONE: TRUNK
LOCATION ZONE: TRUNK

## 2023-10-23 ASSESSMENT — LOCATION SIMPLE DESCRIPTION DERM
LOCATION SIMPLE: ABDOMEN
LOCATION SIMPLE: LEFT UPPER BACK
LOCATION SIMPLE: LEFT FOREARM
LOCATION SIMPLE: LEFT CHEEK
LOCATION SIMPLE: RIGHT UPPER ARM
LOCATION SIMPLE: ABDOMEN

## 2024-04-23 ENCOUNTER — APPOINTMENT (OUTPATIENT)
Dept: URBAN - METROPOLITAN AREA CLINIC 186 | Age: 54
Setting detail: DERMATOLOGY
End: 2024-04-23

## 2024-04-23 DIAGNOSIS — L81.4 OTHER MELANIN HYPERPIGMENTATION: ICD-10-CM

## 2024-04-23 DIAGNOSIS — D22 MELANOCYTIC NEVI: ICD-10-CM

## 2024-04-23 DIAGNOSIS — L73.8 OTHER SPECIFIED FOLLICULAR DISORDERS: ICD-10-CM

## 2024-04-23 DIAGNOSIS — D18.0 HEMANGIOMA: ICD-10-CM

## 2024-04-23 DIAGNOSIS — Z80.8 FAMILY HISTORY OF MALIGNANT NEOPLASM OF OTHER ORGANS OR SYSTEMS: ICD-10-CM

## 2024-04-23 DIAGNOSIS — L82.1 OTHER SEBORRHEIC KERATOSIS: ICD-10-CM

## 2024-04-23 DIAGNOSIS — L57.0 ACTINIC KERATOSIS: ICD-10-CM

## 2024-04-23 DIAGNOSIS — Z85.820 PERSONAL HISTORY OF MALIGNANT MELANOMA OF SKIN: ICD-10-CM

## 2024-04-23 PROBLEM — D18.01 HEMANGIOMA OF SKIN AND SUBCUTANEOUS TISSUE: Status: ACTIVE | Noted: 2024-04-23

## 2024-04-23 PROBLEM — D22.62 MELANOCYTIC NEVI OF LEFT UPPER LIMB, INCLUDING SHOULDER: Status: ACTIVE | Noted: 2024-04-23

## 2024-04-23 PROCEDURE — OTHER REASSURANCE: OTHER

## 2024-04-23 PROCEDURE — 99213 OFFICE O/P EST LOW 20 MIN: CPT | Mod: 25

## 2024-04-23 PROCEDURE — 17000 DESTRUCT PREMALG LESION: CPT

## 2024-04-23 PROCEDURE — OTHER COUNSELING: OTHER

## 2024-04-23 PROCEDURE — OTHER LIQUID NITROGEN: OTHER

## 2024-04-23 PROCEDURE — OTHER ADDITIONAL NOTES: OTHER

## 2024-04-23 PROCEDURE — OTHER SUNSCREEN RECOMMENDATIONS: OTHER

## 2024-04-23 PROCEDURE — 17003 DESTRUCT PREMALG LES 2-14: CPT

## 2024-04-23 ASSESSMENT — LOCATION SIMPLE DESCRIPTION DERM
LOCATION SIMPLE: RIGHT TEMPLE
LOCATION SIMPLE: RIGHT UPPER ARM
LOCATION SIMPLE: LEFT CHEEK
LOCATION SIMPLE: LEFT UPPER BACK
LOCATION SIMPLE: ABDOMEN
LOCATION SIMPLE: LEFT FOREARM
LOCATION SIMPLE: RIGHT CHEEK
LOCATION SIMPLE: ABDOMEN

## 2024-04-23 ASSESSMENT — LOCATION DETAILED DESCRIPTION DERM
LOCATION DETAILED: EPIGASTRIC SKIN
LOCATION DETAILED: PERIUMBILICAL SKIN
LOCATION DETAILED: RIGHT CENTRAL MALAR CHEEK
LOCATION DETAILED: RIGHT ANTERIOR PROXIMAL UPPER ARM
LOCATION DETAILED: RIGHT MID TEMPLE
LOCATION DETAILED: LEFT SUPERIOR UPPER BACK
LOCATION DETAILED: LEFT PROXIMAL DORSAL FOREARM
LOCATION DETAILED: EPIGASTRIC SKIN
LOCATION DETAILED: LEFT CENTRAL MALAR CHEEK

## 2024-04-23 ASSESSMENT — LOCATION ZONE DERM
LOCATION ZONE: TRUNK
LOCATION ZONE: ARM
LOCATION ZONE: TRUNK
LOCATION ZONE: FACE

## 2024-04-23 NOTE — PROCEDURE: ADDITIONAL NOTES
Additional Notes: Patient had E&R (Excision and Repair) in 2016 to left cheek for Malignant Melanoma
Detail Level: Simple
Additional Notes: Patient’s father has history of Malignant Melanoma
done

## 2024-04-23 NOTE — PROCEDURE: LIQUID NITROGEN
Number Of Freeze-Thaw Cycles: 2 freeze-thaw cycles
Post-Care Instructions: I reviewed with the patient in detail post-care instructions. Patient is to wear sunprotection, and avoid picking at any of the treated lesions. Pt may apply Vaseline to crusted or scabbing areas.
Total Number Of Aks Treated: 2
Render Post-Care Instructions In Note?: no
Consent: The patient's consent was obtained including but not limited to risks of crusting, scabbing, blistering, scarring, darker or lighter pigmentary change, recurrence, incomplete removal and infection.
Detail Level: Zone
Duration Of Freeze Thaw-Cycle (Seconds): 5

## 2024-10-21 ENCOUNTER — APPOINTMENT (OUTPATIENT)
Dept: URBAN - METROPOLITAN AREA CLINIC 186 | Age: 54
Setting detail: DERMATOLOGY
End: 2024-10-21

## 2024-10-21 DIAGNOSIS — L82.1 OTHER SEBORRHEIC KERATOSIS: ICD-10-CM

## 2024-10-21 DIAGNOSIS — D22 MELANOCYTIC NEVI: ICD-10-CM

## 2024-10-21 DIAGNOSIS — Z85.820 PERSONAL HISTORY OF MALIGNANT MELANOMA OF SKIN: ICD-10-CM

## 2024-10-21 DIAGNOSIS — F42.4 EXCORIATION (SKIN-PICKING) DISORDER: ICD-10-CM

## 2024-10-21 DIAGNOSIS — L81.4 OTHER MELANIN HYPERPIGMENTATION: ICD-10-CM

## 2024-10-21 DIAGNOSIS — Z80.8 FAMILY HISTORY OF MALIGNANT NEOPLASM OF OTHER ORGANS OR SYSTEMS: ICD-10-CM

## 2024-10-21 DIAGNOSIS — Z87.2 PERSONAL HISTORY OF DISEASES OF THE SKIN AND SUBCUTANEOUS TISSUE: ICD-10-CM

## 2024-10-21 DIAGNOSIS — D18.0 HEMANGIOMA: ICD-10-CM

## 2024-10-21 DIAGNOSIS — D485 NEOPLASM OF UNCERTAIN BEHAVIOR OF SKIN: ICD-10-CM

## 2024-10-21 DIAGNOSIS — L73.8 OTHER SPECIFIED FOLLICULAR DISORDERS: ICD-10-CM

## 2024-10-21 PROBLEM — D18.01 HEMANGIOMA OF SKIN AND SUBCUTANEOUS TISSUE: Status: ACTIVE | Noted: 2024-10-21

## 2024-10-21 PROBLEM — D22.62 MELANOCYTIC NEVI OF LEFT UPPER LIMB, INCLUDING SHOULDER: Status: ACTIVE | Noted: 2024-10-21

## 2024-10-21 PROBLEM — D48.5 NEOPLASM OF UNCERTAIN BEHAVIOR OF SKIN: Status: ACTIVE | Noted: 2024-10-21

## 2024-10-21 PROCEDURE — OTHER COUNSELING: OTHER

## 2024-10-21 PROCEDURE — OTHER DERMTECH: PLA - PIGMENTED LESION ASSAY: OTHER

## 2024-10-21 PROCEDURE — OTHER REASSURANCE: OTHER

## 2024-10-21 PROCEDURE — OTHER ADDITIONAL NOTES: OTHER

## 2024-10-21 PROCEDURE — OTHER SUNSCREEN RECOMMENDATIONS: OTHER

## 2024-10-21 PROCEDURE — 99213 OFFICE O/P EST LOW 20 MIN: CPT

## 2024-10-21 ASSESSMENT — LOCATION SIMPLE DESCRIPTION DERM
LOCATION SIMPLE: RIGHT UPPER ARM
LOCATION SIMPLE: ABDOMEN
LOCATION SIMPLE: ABDOMEN
LOCATION SIMPLE: LEFT FOREHEAD
LOCATION SIMPLE: LEFT UPPER BACK
LOCATION SIMPLE: LEFT FOREARM
LOCATION SIMPLE: RIGHT CHEEK
LOCATION SIMPLE: LEFT CHEEK

## 2024-10-21 ASSESSMENT — LOCATION ZONE DERM
LOCATION ZONE: FACE
LOCATION ZONE: TRUNK
LOCATION ZONE: TRUNK
LOCATION ZONE: ARM

## 2024-10-21 ASSESSMENT — LOCATION DETAILED DESCRIPTION DERM
LOCATION DETAILED: RIGHT MID PREAURICULAR CHEEK
LOCATION DETAILED: RIGHT ANTERIOR PROXIMAL UPPER ARM
LOCATION DETAILED: EPIGASTRIC SKIN
LOCATION DETAILED: LEFT MEDIAL FOREHEAD
LOCATION DETAILED: EPIGASTRIC SKIN
LOCATION DETAILED: LEFT SUPERIOR UPPER BACK
LOCATION DETAILED: LEFT PROXIMAL DORSAL FOREARM
LOCATION DETAILED: LEFT CENTRAL MALAR CHEEK
LOCATION DETAILED: PERIUMBILICAL SKIN

## 2024-10-21 NOTE — PROCEDURE: [facility]: PLA - PIGMENTED LESION ASSAY
Detail Level: Detailed
Size Of Lesion In Cm (Optional): 0
Biopsy Type: RNA evaluation
Lab: -[id number]
Pre Procedure Details (Documentation Required By [facility]: The lesion was visually assessed and meets one or more clinical signs suggestive of melanoma (ABCDE). The skin is intact without other conditions or scarring and the area has not been biopsied before. I have the requisite knowledge, skills and experience to evaluate and manage pigmented lesions. The lesion submitted is for a patient having skin type Razo I, II or III. Patient was counseled that Lesions with positive results will be considered for biopsy. Lesions with negative results will be clinically followed and monitored for changes.
Procedure Details: The first patch was applied to the lesion, adhesive side down, and rubbed in a circular motion 15 times.  Following this the lesion was outlined with a permanent marker and the patch was then secured, adhesive side down, in the provided transport case.  This process was repeated for patches 2 through 4 and then sent to [facility] in the provided self-addressed envelope.
Render Path Notes In Note?: No
Consent: Written consent was obtained and risks were reviewed including but not limited to mild pain and skin irritation.
Post-Care Instructions: I reviewed with the patient in detail post-care instructions.
Notification Instructions: Patient will be notified of biopsy results. However, patient instructed to call the office if not contacted within 2 weeks.
Billing Type: Third-Party Bill

## 2024-11-05 ENCOUNTER — APPOINTMENT (OUTPATIENT)
Dept: URBAN - METROPOLITAN AREA CLINIC 186 | Age: 54
Setting detail: DERMATOLOGY
End: 2024-11-05

## 2024-11-05 DIAGNOSIS — D485 NEOPLASM OF UNCERTAIN BEHAVIOR OF SKIN: ICD-10-CM

## 2024-11-05 PROBLEM — D48.5 NEOPLASM OF UNCERTAIN BEHAVIOR OF SKIN: Status: ACTIVE | Noted: 2024-11-05

## 2024-11-05 PROCEDURE — 11102 TANGNTL BX SKIN SINGLE LES: CPT

## 2024-11-05 PROCEDURE — OTHER ADDITIONAL NOTES: OTHER

## 2024-11-05 PROCEDURE — OTHER COUNSELING: OTHER

## 2024-11-05 PROCEDURE — OTHER BIOPSY BY SHAVE METHOD: OTHER

## 2024-11-05 ASSESSMENT — LOCATION SIMPLE DESCRIPTION DERM: LOCATION SIMPLE: RIGHT CHEEK

## 2024-11-05 ASSESSMENT — LOCATION DETAILED DESCRIPTION DERM: LOCATION DETAILED: RIGHT MID PREAURICULAR CHEEK

## 2024-11-05 ASSESSMENT — LOCATION ZONE DERM: LOCATION ZONE: FACE

## 2024-11-05 NOTE — PROCEDURE: BIOPSY BY SHAVE METHOD
Detail Level: Detailed
Depth Of Biopsy: dermis
Was A Bandage Applied: Yes
Size Of Lesion In Cm: 0
Biopsy Type: H and E
Biopsy Method: Dermablade
Anesthesia Type: 1% lidocaine with epinephrine
Anesthesia Volume In Cc: 0.5
Hemostasis: Drysol
Wound Care: Petrolatum
Dressing: bandage
Destruction After The Procedure: No
Type Of Destruction Used: Curettage
Curettage Text: The wound bed was treated with curettage after the biopsy was performed.
Cryotherapy Text: The wound bed was treated with cryotherapy after the biopsy was performed.
Electrodesiccation Text: The wound bed was treated with electrodesiccation after the biopsy was performed.
Electrodesiccation And Curettage Text: The wound bed was treated with electrodesiccation and curettage after the biopsy was performed.
Silver Nitrate Text: The wound bed was treated with silver nitrate after the biopsy was performed.
Lab: [id number]
Lab Facility: [id number]
Consent: Written consent was obtained and risks were reviewed including but not limited to scarring, infection, bleeding, scabbing, incomplete removal, nerve damage and allergy to anesthesia.
Post-Care Instructions: I reviewed with the patient in detail post-care instructions. Patient is to keep the biopsy site dry overnight, and then apply bacitracin twice daily until healed. Patient may apply hydrogen peroxide soaks to remove any crusting.
Notification Instructions: Patient will be notified of biopsy results. However, patient instructed to call the office if not contacted within 2 weeks.
Billing Type: Third-Party Bill
Information: Selecting Yes will display possible errors in your note based on the variables you have selected. This validation is only offered as a suggestion for you. PLEASE NOTE THAT THE VALIDATION TEXT WILL BE REMOVED WHEN YOU FINALIZE YOUR NOTE. IF YOU WANT TO FAX A PRELIMINARY NOTE YOU WILL NEED TO TOGGLE THIS TO 'NO' IF YOU DO NOT WANT IT IN YOUR FAXED NOTE.

## 2025-01-13 ENCOUNTER — APPOINTMENT (OUTPATIENT)
Age: 55
Setting detail: DERMATOLOGY
End: 2025-01-14

## 2025-01-13 VITALS — WEIGHT: 250 LBS | HEIGHT: 71 IN

## 2025-01-13 DIAGNOSIS — D84.9 IMMUNODEFICIENCY, UNSPECIFIED: ICD-10-CM

## 2025-01-13 DIAGNOSIS — D485 NEOPLASM OF UNCERTAIN BEHAVIOR OF SKIN: ICD-10-CM

## 2025-01-13 PROBLEM — D48.5 NEOPLASM OF UNCERTAIN BEHAVIOR OF SKIN: Status: ACTIVE | Noted: 2025-01-13

## 2025-01-13 PROCEDURE — OTHER REFERRAL CORRESPONDENCE: OTHER

## 2025-01-13 PROCEDURE — 99244 OFF/OP CNSLTJ NEW/EST MOD 40: CPT

## 2025-01-13 PROCEDURE — OTHER MIPS QUALITY: OTHER

## 2025-01-13 PROCEDURE — OTHER SOCIAL DETERMINANTS OF HEALTH ASSESSMENT: OTHER

## 2025-01-13 PROCEDURE — OTHER PLAN FOR BMI MANAGEMENT: OTHER

## 2025-01-13 PROCEDURE — OTHER COUNSELING: OTHER

## 2025-01-13 PROCEDURE — OTHER CONSULTATION FOR SURGERY: OTHER

## 2025-01-13 PROCEDURE — OTHER CPT CODE GENERATOR: OTHER

## 2025-01-13 PROCEDURE — OTHER PRESCRIPTION: OTHER

## 2025-01-13 RX ORDER — OXYCODONE HYDROCHLORIDE AND ACETAMINOPHEN 5; 325 MG/1; MG/1
TABLET ORAL
Qty: 8 | Refills: 0 | COMMUNITY
Start: 2025-01-13

## 2025-01-13 ASSESSMENT — LOCATION ZONE DERM: LOCATION ZONE: FACE

## 2025-01-13 ASSESSMENT — LOCATION SIMPLE DESCRIPTION DERM: LOCATION SIMPLE: RIGHT CHEEK

## 2025-01-13 ASSESSMENT — LOCATION DETAILED DESCRIPTION DERM: LOCATION DETAILED: RIGHT MID PREAURICULAR CHEEK

## 2025-02-03 ENCOUNTER — APPOINTMENT (OUTPATIENT)
Age: 55
Setting detail: DERMATOLOGY
End: 2025-03-29

## 2025-02-03 VITALS — WEIGHT: 240 LBS | HEIGHT: 72 IN

## 2025-02-03 DIAGNOSIS — D485 NEOPLASM OF UNCERTAIN BEHAVIOR OF SKIN: ICD-10-CM

## 2025-02-03 PROBLEM — D48.5 NEOPLASM OF UNCERTAIN BEHAVIOR OF SKIN: Status: ACTIVE | Noted: 2025-02-03

## 2025-02-03 PROCEDURE — 99212 OFFICE O/P EST SF 10 MIN: CPT | Mod: 57

## 2025-02-03 PROCEDURE — 17311 MOHS 1 STAGE H/N/HF/G: CPT

## 2025-02-03 PROCEDURE — OTHER DECISION TO PERFORM MAJOR SURGERY: OTHER

## 2025-02-03 PROCEDURE — OTHER PLAN FOR BMI MANAGEMENT: OTHER

## 2025-02-03 PROCEDURE — OTHER MIPS QUALITY: OTHER

## 2025-02-03 PROCEDURE — OTHER SOCIAL DETERMINANTS OF HEALTH ASSESSMENT: OTHER

## 2025-02-03 PROCEDURE — 88342 IMHCHEM/IMCYTCHM 1ST ANTB: CPT | Mod: 59

## 2025-02-03 PROCEDURE — 17315 MOHS SURG ADDL BLOCK: CPT

## 2025-02-03 PROCEDURE — OTHER MOHS SURGERY: OTHER

## 2025-02-03 ASSESSMENT — LOCATION ZONE DERM: LOCATION ZONE: FACE

## 2025-02-03 ASSESSMENT — LOCATION SIMPLE DESCRIPTION DERM: LOCATION SIMPLE: RIGHT CHEEK

## 2025-02-03 ASSESSMENT — LOCATION DETAILED DESCRIPTION DERM: LOCATION DETAILED: RIGHT LATERAL MALAR CHEEK

## 2025-02-04 ENCOUNTER — APPOINTMENT (OUTPATIENT)
Age: 55
Setting detail: DERMATOLOGY
End: 2025-03-29

## 2025-02-04 VITALS — WEIGHT: 240 LBS | HEIGHT: 72 IN

## 2025-02-04 DIAGNOSIS — D485 NEOPLASM OF UNCERTAIN BEHAVIOR OF SKIN: ICD-10-CM

## 2025-02-04 PROBLEM — Z85.820 PERSONAL HISTORY OF MALIGNANT MELANOMA OF SKIN: Status: ACTIVE | Noted: 2025-02-04

## 2025-02-04 PROBLEM — D48.5 NEOPLASM OF UNCERTAIN BEHAVIOR OF SKIN: Status: ACTIVE | Noted: 2025-02-04

## 2025-02-04 PROBLEM — D03.39 MELANOMA IN SITU OF OTHER PARTS OF FACE: Status: ACTIVE | Noted: 2025-02-04

## 2025-02-04 PROCEDURE — OTHER SOCIAL DETERMINANTS OF HEALTH ASSESSMENT: OTHER

## 2025-02-04 PROCEDURE — OTHER MIPS QUALITY: OTHER

## 2025-02-04 PROCEDURE — OTHER REPAIR NOTE: OTHER

## 2025-02-04 PROCEDURE — OTHER PLAN FOR BMI MANAGEMENT: OTHER

## 2025-02-04 PROCEDURE — 14301 TIS TRNFR ANY 30.1-60 SQ CM: CPT

## 2025-02-04 PROCEDURE — 15004 WOUND PREP F/N/HF/G: CPT

## 2025-02-04 ASSESSMENT — LOCATION DETAILED DESCRIPTION DERM: LOCATION DETAILED: RIGHT LATERAL MALAR CHEEK

## 2025-02-04 ASSESSMENT — LOCATION ZONE DERM: LOCATION ZONE: FACE

## 2025-02-04 ASSESSMENT — LOCATION SIMPLE DESCRIPTION DERM: LOCATION SIMPLE: RIGHT CHEEK

## 2025-02-11 ENCOUNTER — APPOINTMENT (OUTPATIENT)
Age: 55
Setting detail: DERMATOLOGY
End: 2025-03-29

## 2025-02-11 VITALS — HEIGHT: 72 IN | WEIGHT: 240 LBS

## 2025-02-11 DIAGNOSIS — Z48.817 ENCOUNTER FOR SURGICAL AFTERCARE FOLLOWING SURGERY ON THE SKIN AND SUBCUTANEOUS TISSUE: ICD-10-CM

## 2025-02-11 PROCEDURE — OTHER POST-OP WOUND CHECK: OTHER

## 2025-02-11 ASSESSMENT — LOCATION ZONE DERM: LOCATION ZONE: FACE

## 2025-02-11 ASSESSMENT — LOCATION SIMPLE DESCRIPTION DERM: LOCATION SIMPLE: RIGHT CHEEK

## 2025-02-11 ASSESSMENT — LOCATION DETAILED DESCRIPTION DERM: LOCATION DETAILED: RIGHT CENTRAL MALAR CHEEK

## 2025-03-19 ENCOUNTER — APPOINTMENT (OUTPATIENT)
Age: 55
Setting detail: DERMATOLOGY
End: 2025-03-29

## 2025-03-19 VITALS — HEIGHT: 71 IN | WEIGHT: 240 LBS

## 2025-03-19 DIAGNOSIS — Z08 ENCOUNTER FOR FOLLOW-UP EXAMINATION AFTER COMPLETED TREATMENT FOR MALIGNANT NEOPLASM: ICD-10-CM

## 2025-03-19 PROCEDURE — OTHER RETURN TO REFERRING PROVIDER: OTHER

## 2025-03-19 PROCEDURE — OTHER TREATMENT REGIMEN: OTHER

## 2025-03-19 PROCEDURE — OTHER POST-OP WOUND CHECK: OTHER

## 2025-03-19 PROCEDURE — 99024 POSTOP FOLLOW-UP VISIT: CPT

## 2025-03-19 ASSESSMENT — LOCATION DETAILED DESCRIPTION DERM: LOCATION DETAILED: RIGHT SUPERIOR CENTRAL MALAR CHEEK

## 2025-03-19 ASSESSMENT — LOCATION ZONE DERM: LOCATION ZONE: FACE

## 2025-03-19 ASSESSMENT — PAIN INTENSITY VAS: HOW INTENSE IS YOUR PAIN 0 BEING NO PAIN, 10 BEING THE MOST SEVERE PAIN POSSIBLE?: NO PAIN

## 2025-03-19 ASSESSMENT — LOCATION SIMPLE DESCRIPTION DERM: LOCATION SIMPLE: RIGHT CHEEK

## 2025-04-23 ENCOUNTER — APPOINTMENT (OUTPATIENT)
Dept: URBAN - METROPOLITAN AREA CLINIC 186 | Age: 55
Setting detail: DERMATOLOGY
End: 2025-04-23

## 2025-04-23 DIAGNOSIS — L81.4 OTHER MELANIN HYPERPIGMENTATION: ICD-10-CM

## 2025-04-23 DIAGNOSIS — Z87.2 PERSONAL HISTORY OF DISEASES OF THE SKIN AND SUBCUTANEOUS TISSUE: ICD-10-CM

## 2025-04-23 DIAGNOSIS — L57.0 ACTINIC KERATOSIS: ICD-10-CM

## 2025-04-23 DIAGNOSIS — Z85.820 PERSONAL HISTORY OF MALIGNANT MELANOMA OF SKIN: ICD-10-CM

## 2025-04-23 DIAGNOSIS — Z80.8 FAMILY HISTORY OF MALIGNANT NEOPLASM OF OTHER ORGANS OR SYSTEMS: ICD-10-CM

## 2025-04-23 DIAGNOSIS — L73.8 OTHER SPECIFIED FOLLICULAR DISORDERS: ICD-10-CM

## 2025-04-23 DIAGNOSIS — L82.1 OTHER SEBORRHEIC KERATOSIS: ICD-10-CM

## 2025-04-23 DIAGNOSIS — D18.0 HEMANGIOMA: ICD-10-CM

## 2025-04-23 DIAGNOSIS — D22 MELANOCYTIC NEVI: ICD-10-CM

## 2025-04-23 PROBLEM — D18.01 HEMANGIOMA OF SKIN AND SUBCUTANEOUS TISSUE: Status: ACTIVE | Noted: 2025-04-23

## 2025-04-23 PROBLEM — D22.62 MELANOCYTIC NEVI OF LEFT UPPER LIMB, INCLUDING SHOULDER: Status: ACTIVE | Noted: 2025-04-23

## 2025-04-23 PROCEDURE — 17000 DESTRUCT PREMALG LESION: CPT

## 2025-04-23 PROCEDURE — OTHER COUNSELING: OTHER

## 2025-04-23 PROCEDURE — OTHER ADDITIONAL NOTES: OTHER

## 2025-04-23 PROCEDURE — 17003 DESTRUCT PREMALG LES 2-14: CPT

## 2025-04-23 PROCEDURE — OTHER LIQUID NITROGEN: OTHER

## 2025-04-23 PROCEDURE — OTHER SUNSCREEN RECOMMENDATIONS: OTHER

## 2025-04-23 PROCEDURE — OTHER REASSURANCE: OTHER

## 2025-04-23 PROCEDURE — 99213 OFFICE O/P EST LOW 20 MIN: CPT | Mod: 25

## 2025-04-23 ASSESSMENT — LOCATION ZONE DERM
LOCATION ZONE: TRUNK
LOCATION ZONE: FACE
LOCATION ZONE: TRUNK
LOCATION ZONE: ARM

## 2025-04-23 ASSESSMENT — LOCATION SIMPLE DESCRIPTION DERM
LOCATION SIMPLE: ABDOMEN
LOCATION SIMPLE: RIGHT FOREHEAD
LOCATION SIMPLE: RIGHT UPPER ARM
LOCATION SIMPLE: LEFT FOREARM
LOCATION SIMPLE: RIGHT CHEEK
LOCATION SIMPLE: LEFT CHEEK
LOCATION SIMPLE: ABDOMEN
LOCATION SIMPLE: LEFT UPPER BACK

## 2025-04-23 ASSESSMENT — LOCATION DETAILED DESCRIPTION DERM
LOCATION DETAILED: EPIGASTRIC SKIN
LOCATION DETAILED: LEFT SUPERIOR UPPER BACK
LOCATION DETAILED: LEFT PROXIMAL DORSAL FOREARM
LOCATION DETAILED: RIGHT MID PREAURICULAR CHEEK
LOCATION DETAILED: EPIGASTRIC SKIN
LOCATION DETAILED: PERIUMBILICAL SKIN
LOCATION DETAILED: LEFT CENTRAL MALAR CHEEK
LOCATION DETAILED: RIGHT ANTERIOR PROXIMAL UPPER ARM
LOCATION DETAILED: RIGHT CENTRAL MALAR CHEEK
LOCATION DETAILED: RIGHT INFERIOR FOREHEAD

## 2025-04-23 NOTE — PROCEDURE: ADDITIONAL NOTES
Additional Notes: Patient had E&R (Excision and Repair) in 2016 to left cheek for Malignant Melanoma
Detail Level: Simple
Additional Notes: Patient’s father has history of Malignant Melanoma
Render Risk Assessment In Note?: no
Additional Notes: Patient had MOHS 3/19/25.   Well healed scar, no sign of reoccurrence